# Patient Record
Sex: FEMALE | Race: WHITE
[De-identification: names, ages, dates, MRNs, and addresses within clinical notes are randomized per-mention and may not be internally consistent; named-entity substitution may affect disease eponyms.]

---

## 2017-10-02 ENCOUNTER — HOSPITAL ENCOUNTER (EMERGENCY)
Dept: HOSPITAL 8 - ED | Age: 37
Discharge: HOME | End: 2017-10-02
Payer: MEDICAID

## 2017-10-02 VITALS — BODY MASS INDEX: 17.66 KG/M2 | HEIGHT: 69 IN | WEIGHT: 119.25 LBS

## 2017-10-02 VITALS — DIASTOLIC BLOOD PRESSURE: 83 MMHG | SYSTOLIC BLOOD PRESSURE: 125 MMHG

## 2017-10-02 DIAGNOSIS — B96.89: ICD-10-CM

## 2017-10-02 DIAGNOSIS — E06.3: ICD-10-CM

## 2017-10-02 DIAGNOSIS — R07.89: Primary | ICD-10-CM

## 2017-10-02 DIAGNOSIS — J20.9: ICD-10-CM

## 2017-10-02 LAB
AST SERPL-CCNC: 13 U/L (ref 15–37)
BUN SERPL-MCNC: 7 MG/DL (ref 7–18)
HCT VFR BLD CALC: 44.5 % (ref 34.6–47.8)
HGB BLD-MCNC: 15.4 G/DL (ref 11.7–16.4)
IS PT STATUS REG ER OR PRE ER?: YES
WBC # BLD AUTO: 11.1 X10^3/UL (ref 3.4–10)

## 2017-10-02 PROCEDURE — 85025 COMPLETE CBC W/AUTO DIFF WBC: CPT

## 2017-10-02 PROCEDURE — 80053 COMPREHEN METABOLIC PANEL: CPT

## 2017-10-02 PROCEDURE — 99285 EMERGENCY DEPT VISIT HI MDM: CPT

## 2017-10-02 PROCEDURE — 93005 ELECTROCARDIOGRAM TRACING: CPT

## 2017-10-02 PROCEDURE — 71010: CPT

## 2017-10-02 PROCEDURE — 85379 FIBRIN DEGRADATION QUANT: CPT

## 2017-10-02 PROCEDURE — 36415 COLL VENOUS BLD VENIPUNCTURE: CPT

## 2017-10-02 PROCEDURE — 84484 ASSAY OF TROPONIN QUANT: CPT

## 2017-10-02 PROCEDURE — 96360 HYDRATION IV INFUSION INIT: CPT

## 2017-10-02 PROCEDURE — 84703 CHORIONIC GONADOTROPIN ASSAY: CPT

## 2018-03-11 ENCOUNTER — HOSPITAL ENCOUNTER (EMERGENCY)
Dept: HOSPITAL 8 - ED | Age: 38
Discharge: HOME | End: 2018-03-11
Payer: MEDICAID

## 2018-03-11 VITALS — DIASTOLIC BLOOD PRESSURE: 71 MMHG | SYSTOLIC BLOOD PRESSURE: 109 MMHG

## 2018-03-11 VITALS — HEIGHT: 69 IN | BODY MASS INDEX: 18.58 KG/M2 | WEIGHT: 125.44 LBS

## 2018-03-11 DIAGNOSIS — G24.3: ICD-10-CM

## 2018-03-11 DIAGNOSIS — M54.2: Primary | ICD-10-CM

## 2018-03-11 PROCEDURE — 99284 EMERGENCY DEPT VISIT MOD MDM: CPT

## 2018-03-11 PROCEDURE — 96372 THER/PROPH/DIAG INJ SC/IM: CPT

## 2018-03-11 PROCEDURE — 73030 X-RAY EXAM OF SHOULDER: CPT

## 2018-03-11 PROCEDURE — 72125 CT NECK SPINE W/O DYE: CPT

## 2019-11-26 ENCOUNTER — HOSPITAL ENCOUNTER (EMERGENCY)
Dept: HOSPITAL 8 - ED | Age: 39
Discharge: HOME | End: 2019-11-26
Payer: MEDICAID

## 2019-11-26 VITALS — WEIGHT: 121.7 LBS | HEIGHT: 69 IN | BODY MASS INDEX: 18.02 KG/M2

## 2019-11-26 VITALS — SYSTOLIC BLOOD PRESSURE: 110 MMHG | DIASTOLIC BLOOD PRESSURE: 68 MMHG
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
## 2019-11-26 DIAGNOSIS — S29.012A: Primary | ICD-10-CM
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
## 2019-11-26 PROCEDURE — 99283 EMERGENCY DEPT VISIT LOW MDM: CPT

## 2019-11-26 PROCEDURE — 96372 THER/PROPH/DIAG INJ SC/IM: CPT
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
## 2020-08-05 ENCOUNTER — HOSPITAL ENCOUNTER (EMERGENCY)
Dept: HOSPITAL 8 - ED | Age: 40
Discharge: HOME | End: 2020-08-05
Payer: MEDICAID

## 2020-08-05 VITALS — SYSTOLIC BLOOD PRESSURE: 108 MMHG | DIASTOLIC BLOOD PRESSURE: 67 MMHG

## 2020-08-05 VITALS — WEIGHT: 118.61 LBS | HEIGHT: 68 IN | BODY MASS INDEX: 17.98 KG/M2

## 2020-08-05 DIAGNOSIS — M54.41: ICD-10-CM
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
## 2020-08-05 DIAGNOSIS — S29.012A: ICD-10-CM
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
## 2020-08-05 DIAGNOSIS — S39.012A: Primary | ICD-10-CM
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
## 2020-08-05 PROCEDURE — 99283 EMERGENCY DEPT VISIT LOW MDM: CPT

## 2020-08-05 PROCEDURE — 96372 THER/PROPH/DIAG INJ SC/IM: CPT

## 2020-08-05 NOTE — NUR
TASK RN. PT D/C'D PER ORDERS, VERBALIZED UNDERSTANDING OF D/C ORDERS. PT WITH 
STEADY GAIT UPON D/C.

## 2020-08-05 NOTE — NUR
Pt walked back to room 19 from [facility]. Pt c/o lower back pain and neck pain. Pt 
sitting comfortably in [facility] at this time, to be medicated per eMAR

## 2020-08-30 ENCOUNTER — HOSPITAL ENCOUNTER (EMERGENCY)
Dept: HOSPITAL 8 - ED | Age: 40
Discharge: HOME | End: 2020-08-30
Payer: MEDICAID

## 2020-08-30 VITALS — DIASTOLIC BLOOD PRESSURE: 57 MMHG | SYSTOLIC BLOOD PRESSURE: 108 MMHG

## 2020-08-30 VITALS — HEIGHT: 69 IN | WEIGHT: 112.44 LBS | BODY MASS INDEX: 16.65 KG/M2

## 2020-08-30 DIAGNOSIS — E86.0: ICD-10-CM

## 2020-08-30 DIAGNOSIS — R51: ICD-10-CM

## 2020-08-30 DIAGNOSIS — K29.00: Primary | ICD-10-CM

## 2020-08-30 DIAGNOSIS — Z98.51: ICD-10-CM

## 2020-08-30 DIAGNOSIS — R11.0: ICD-10-CM

## 2020-08-30 LAB
ANION GAP SERPL CALC-SCNC: 4 MMOL/L (ref 5–15)
BASOPHILS # BLD AUTO: 0.05 X10^3/UL (ref 0–0.1)
BASOPHILS NFR BLD AUTO: 1 % (ref 0–1)
CALCIUM SERPL-MCNC: 9.2 MG/DL (ref 8.5–10.1)
CHLORIDE SERPL-SCNC: 107 MMOL/L (ref 98–107)
CK SERPL-CCNC: 58 U/L (ref 26–192)
CREAT SERPL-MCNC: 0.84 MG/DL (ref 0.55–1.02)
EOSINOPHIL # BLD AUTO: 0.22 X10^3/UL (ref 0–0.4)
EOSINOPHIL NFR BLD AUTO: 4 % (ref 1–7)
ERYTHROCYTE [DISTWIDTH] IN BLOOD BY AUTOMATED COUNT: 14.4 % (ref 9.6–15.2)
LYMPHOCYTES # BLD AUTO: 1.25 X10^3/UL (ref 1–3.4)
LYMPHOCYTES NFR BLD AUTO: 21 % (ref 22–44)
MCH RBC QN AUTO: 30.3 PG (ref 27–34.8)
MCHC RBC AUTO-ENTMCNC: 33.3 G/DL (ref 32.4–35.8)
MCV RBC AUTO: 91.1 FL (ref 80–100)
MD: NO
MONOCYTES # BLD AUTO: 0.48 X10^3/UL (ref 0.2–0.8)
MONOCYTES NFR BLD AUTO: 8 % (ref 2–9)
NEUTROPHILS # BLD AUTO: 4.12 X10^3/UL (ref 1.8–6.8)
NEUTROPHILS NFR BLD AUTO: 67 % (ref 42–75)
PLATELET # BLD AUTO: 202 X10^3/UL (ref 130–400)
PMV BLD AUTO: 10.4 FL (ref 7.4–10.4)
RBC # BLD AUTO: 5.04 X10^6/UL (ref 3.82–5.3)

## 2020-08-30 PROCEDURE — 80048 BASIC METABOLIC PNL TOTAL CA: CPT

## 2020-08-30 PROCEDURE — 96374 THER/PROPH/DIAG INJ IV PUSH: CPT

## 2020-08-30 PROCEDURE — 85025 COMPLETE CBC W/AUTO DIFF WBC: CPT

## 2020-08-30 PROCEDURE — 36415 COLL VENOUS BLD VENIPUNCTURE: CPT

## 2020-08-30 PROCEDURE — 82550 ASSAY OF CK (CPK): CPT

## 2020-08-30 PROCEDURE — 99283 EMERGENCY DEPT VISIT LOW MDM: CPT
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
